# Patient Record
Sex: FEMALE
[De-identification: names, ages, dates, MRNs, and addresses within clinical notes are randomized per-mention and may not be internally consistent; named-entity substitution may affect disease eponyms.]

---

## 2018-06-17 ENCOUNTER — HOSPITAL ENCOUNTER (EMERGENCY)
Dept: HOSPITAL 42 - ED | Age: 43
LOS: 1 days | Discharge: HOME | End: 2018-06-18
Payer: COMMERCIAL

## 2018-06-17 VITALS — TEMPERATURE: 98.3 F | OXYGEN SATURATION: 100 %

## 2018-06-17 DIAGNOSIS — H92.02: Primary | ICD-10-CM

## 2018-06-17 NOTE — ED PDOC
Arrival/HPI





- General


Historian: Patient





- History of Present Illness


Time/Duration: < month


Symptom Onset: Gradual


Symptom Course: Unchanged


Quality: Pressure, Fullness, Throbbing





<Vinod Gray - Last Filed: 06/18/18 00:05>





<Corky Chambers - Last Filed: 06/18/18 00:37>





- General


Chief Complaint: ENT Problem


Time Seen by Provider: 06/17/18 23:16





- History of Present Illness


Narrative History of Present Illness (Text): 





06/17/18 23:48


Patient is a 44 yo F with PMH of esophagitis presents to ED due to left ear 

pain. Patient states that about 1 month ago she was diagnosed with otitis media 

and given 5 days of Amoxicillin on follow up she stated that left ear pain was 

not getting better and was then given another 5 days of Amoxicillin. After 10 

days of antibiotics, patient stated that she wasn't feeling any better. She 

complained of left ear pain, pressure, tinnitus, and feeling of liquid in ear. 

Patient went to urgent care to be reassessed. At that point, she was told that 

the infection had resolved, but blood was visualized. Patient was evaluated by 

ENT who prescribed her prednisone. Patient followed up with ENT last Friday and 

stated that the left ear appeared normal. Patient presents to the ED today 

because her symptoms have not improved, which include left ear pain, tinnitus, 

muffled hearing, and feeling of liquid in ear. Patient denies sinus pressure/

pain, congestion, sore throat, post nasal drip, cough, CP, SOB, n/v/d, 

abdominal pain, fever, chills, HA, or dizziness.


 (Vinod Gray)





Past Medical History





- Provider Review


Nursing Documentation Reviewed: Yes





- Cardiac


Hx Cardiac Disorders: No





- Pulmonary


Hx Respiratory Disorders: No





- Neurological


Hx Neurological Disorder: No





- HEENT


Hx HEENT Disorder: No





- Renal


Hx Renal Disorder: No





- Endocrine/Metabolic


Hx Endocrine Disorders: No





- Hematological/Oncological


Hx Blood Disorders: No





- Integumentary


Hx Dermatological Disorder: No





- Musculoskeletal/Rheumatological


Hx Musculoskeletal Disorders: No





- Gastrointestinal


Hx Gastrointestinal Disorders: Yes


Other/Comment: Esophageal Erosion





- Genitourinary/Gynecological


Hx Genitourinary Disorders: No





- Psychiatric


Hx Psychophysiologic Disorder: No


Hx Substance Use: No





- Anesthesia


Hx Anesthesia: No





<Vinod Gray - Last Filed: 06/18/18 00:05>





Family/Social History





- Physician Review


Nursing Documentation Reviewed: Yes


Family/Social History: No Known Family HX


Smoking Status: Never Smoked


Hx Alcohol Use: Yes


Frequency of alcohol use: Socially


Hx Substance Use: No





<Vinod Gray - Last Filed: 06/18/18 00:05>





Allergies/Home Meds





<Vinod Gray - Last Filed: 06/18/18 00:05>





<Corky Chambers - Last Filed: 06/18/18 00:37>


Allergies/Adverse Reactions: 


Allergies





minocycline [From Minocin] Allergy (Verified 06/17/18 23:34)


 SWELLING








Home Medications: 


 Home Meds











 Medication  Instructions  Recorded  Confirmed


 


Pantoprazole [Protonix] 40 mg PO DAILY PRN 06/17/18 06/17/18














Review of Systems





- Physician Review


All systems were reviewed & negative as marked: Yes (12 point ROS reviewed and 

is negative other than what is stated in HPI.)





<Vinod Gray - Last Filed: 06/18/18 00:05>





Physical Exam


Vital Signs Reviewed: Yes


Temperature: Afebrile


Blood Pressure: Normal


Pulse: Regular


Respiratory Rate: Normal


Appearance: Positive for: Non-Toxic


Pain Distress: Mild


Mental Status: Positive for: Alert and Oriented X 3





- Systems Exam


Head: Present: Atraumatic, Normocephalic


Pupils: Present: PERRL


Extroacular Muscles: Present: EOMI


Conjunctiva: Present: Normal


Ears: Present: Normal, NORMAL TM, Erythema (mild left canal erythema), Normal 

Canal.  No: TM Bulging, Fluid, TM Perf


Mouth: Present: Moist Mucous Membranes


Neck: Present: Normal Range of Motion


Respiratory/Chest: Present: Clear to Auscultation


Cardiovascular: Present: Regular Rate and Rhythm


Abdomen: No: Tenderness, Distention, Rebound, Guarding


Upper Extremity: Present: Normal Inspection


Lower Extremity: Present: Normal Inspection


Neurological: Present: GCS=15, CN II-XII Intact, Speech Normal


Skin: Present: Warm, Dry, Normal Color.  No: Rashes


Psychiatric: Present: Alert, Oriented x 3





<Vinod Gray - Last Filed: 06/18/18 00:05>


Vital Signs











  Temp Pulse Resp BP Pulse Ox


 


 06/18/18 00:06   81  18  120/74  100


 


 06/17/18 23:35  98.3 F  90  17  124/85  100














Medical Decision Making





<Vinod Gray - Last Filed: 06/18/18 00:05>





<Corky Chambers - Last Filed: 06/18/18 00:37>


ED Course and Treatment: 





06/18/18 00:03


44 yo F presents to ED with left ear pain, unchanged after oral antibiotics and 

prednisone.





Plan:


- Rx for Cortisporin ear drops


- ENT follow up


- Discharge


 (Vinod Gray)


Impression:


Pt seen and evaluated with medical resident. Pt, with no significant past 

medical history, who presented to the Emergency department complaining of left 

ear pain x 1 month with associated pressre, and tinnitus. Aware and agree with 

HPI, clinical findings, plan, and management.





Plan:


-- Cortisporin


-- Reassess and disposition





Progress Notes:





 (Corky Chambers)





- PA / NP / Resident Statement


MD/ has reviewed & agrees with the documentation as recorded.


MD/ has examined the patient and agrees with the treatment plan.





<Corky Chambers - Last Filed: 06/18/18 00:37>





Disposition/Present on Arrival





- Present on Arrival


Any Indicators Present on Arrival: No


History of DVT/PE: No


History of Uncontrolled Diabetes: No


Urinary Catheter: No


History of Decub. Ulcer: No


History Surgical Site Infection Following: None





- Disposition


Have Diagnosis and Disposition been Completed?: Yes


Disposition Time: 00:05


Patient Plan: Discharge





<Vinod Gray - Last Filed: 06/18/18 00:05>





<Corky Chambers - Last Filed: 06/18/18 00:37>





- Disposition


Diagnosis: 


 Left ear pain





Disposition: HOME/ ROUTINE


Condition: STABLE


Discharge Instructions (ExitCare):  Outer Ear Infection (DC), How to Use Ear 

Drops


Additional Instructions: 


1. Use ear drops for 7 days


2. Follow up with ENT within the week


3. Return to ED if symptoms worsen such as fever, chills, increased pain, 

discharge from ear





DIAMOND GUTIERREZ, thank you for letting us take care of you today. Your provider 

was Corky Chambers MD and you were treated for ear infection. The emergency 

medical care you received today was directed at your acute symptoms. If you 

were prescribed any medication, please fill it and take as directed. It may 

take several days for your symptoms to resolve. Return to the Emergency 

Department if your symptoms worsen, do not improve, or if you have any other 

problems.





Please contact your doctor or call one of the physicians/clinics you have been 

referred to that are listed on the Patient Visit Information form that is 

included in your discharge packet. Bring any paperwork you were given at 

discharge with you along with any medications you are taking to your follow up 

visit. Our treatment cannot replace ongoing medical care by a primary care 

provider outside of the emergency department.





Thank you for allowing the [facility] team to be part of your care today.





Prescriptions: 


Neomycin/Polymyxin/Hydrocortis [Cortisporin Otic Susp] 4 drop AS TID #1 bottle


Forms:  SeatMe (English)

## 2018-06-18 VITALS — RESPIRATION RATE: 18 BRPM | DIASTOLIC BLOOD PRESSURE: 74 MMHG | HEART RATE: 81 BPM | SYSTOLIC BLOOD PRESSURE: 120 MMHG
